# Patient Record
(demographics unavailable — no encounter records)

---

## 2024-10-14 NOTE — ASSESSMENT
[FreeTextEntry1] : Interim history The patient returns with pain that has been treated adequately in the past with epidural steroid injections.  The patient's complaints are consistent with radiculopathy. Patient's ADL's increase with prior DERRICK.   The last injection gave greater than 60% reduction of the pain but now there is a return of symptoms. The patient is requesting a subsequent epidural steroid injection to alleviate pain and improve functional ability and quality of life.  Patient is a candidate. Objective findings Since the last visit, there have been no new imaging studies. The patient has no new symptoms except the return of the their pain complaints. Motor and sensory function is unchanged. Plan Spoke to patient about epidural steroid injections. Explained risks, benefits and alternatives including but not limited to the risk of infection, bleeding, headache, syncopal episode, failure to resolve issues, allergic reaction, symptom recurrence, allergic reaction, nerve injury, and increased pain. The patient understands the risks. All questions were answered. The patient is willing to proceed.  This note was generated by using Dragon medical dictation software.  A reasonable effort has been made for proofreading its contents, but typos may still remain.  If there are any questions or points of clarification needed, please notify my office.

## 2024-10-14 NOTE — HISTORY OF PRESENT ILLNESS
[Home] : at home, [unfilled] , at the time of the visit. [Medical Office: ([facility])___] : at the medical office located in  [Verbal consent obtained from patient] : the patient, [unfilled] [FreeTextEntry1] : THIS PLEASANT PATIENT IS 45 year OLD  male  WHO PRESENT TODAY IN OFFICE WITH CSPINE RADATING PAIN TO THE RT ARM, WOULD LIKE DISS PLAN OF CARE FOR PAIN     DATE OF INJURY/ONSET  2WKS AGO THAT PROGESSING      PAIN LEVEL:  7/10     MECHANISM OF INJURY: UNKOWN INJURAY         QUALITY OF SYMPTOMS:  PRESSURE/SHARP., TINGLING, NUMBNESS      IMPROVES WITH:  HOTPACKS     WORSE WITH:  LOOKING DWN, COMPLETE ADKLS     PRIOR TREATMENT:  PT STRETCHES, WORKOUT, PHYSICAL ACTIVE ON HIS OWN AS [facility]  PATIENT IS PRESENTING WITH ACUTE/SUB-ACUTE RADICULAR PAIN WITH IMPAIRMENT IN ADLs AND FUNCTIONALITY. THE PATIENT HAS NOT RESPONDED TO CONSERVATIVE CARE INCLUDING NSAID THERAPY AND/OR PHYSICAL THERAPY 2-3X A WEEK FOR 6 WEEK       PRIOR IMAGING:  NO IMAGES, WILL SEND FOR NEW ONE        SCHOOL/SPORT/POSITION/OCCUPATION:       ADDITIONAL INFORMATION